# Patient Record
Sex: FEMALE | Race: WHITE | NOT HISPANIC OR LATINO
[De-identification: names, ages, dates, MRNs, and addresses within clinical notes are randomized per-mention and may not be internally consistent; named-entity substitution may affect disease eponyms.]

---

## 2018-12-25 ENCOUNTER — TRANSCRIPTION ENCOUNTER (OUTPATIENT)
Age: 43
End: 2018-12-25

## 2019-02-12 ENCOUNTER — RESULT REVIEW (OUTPATIENT)
Age: 44
End: 2019-02-12

## 2019-02-12 ENCOUNTER — OUTPATIENT (OUTPATIENT)
Dept: OUTPATIENT SERVICES | Facility: HOSPITAL | Age: 44
LOS: 1 days | Discharge: HOME | End: 2019-02-12
Payer: COMMERCIAL

## 2019-02-12 VITALS
TEMPERATURE: 99 F | SYSTOLIC BLOOD PRESSURE: 140 MMHG | DIASTOLIC BLOOD PRESSURE: 93 MMHG | RESPIRATION RATE: 14 BRPM | WEIGHT: 179.9 LBS | HEIGHT: 70 IN | OXYGEN SATURATION: 97 % | HEART RATE: 77 BPM

## 2019-02-12 VITALS
HEART RATE: 88 BPM | SYSTOLIC BLOOD PRESSURE: 132 MMHG | DIASTOLIC BLOOD PRESSURE: 78 MMHG | TEMPERATURE: 98 F | OXYGEN SATURATION: 100 % | RESPIRATION RATE: 16 BRPM

## 2019-02-12 DIAGNOSIS — N62 HYPERTROPHY OF BREAST: ICD-10-CM

## 2019-02-12 DIAGNOSIS — Z98.890 OTHER SPECIFIED POSTPROCEDURAL STATES: Chronic | ICD-10-CM

## 2019-02-12 LAB — HCG UR QL: NEGATIVE — SIGNIFICANT CHANGE UP

## 2019-02-12 PROCEDURE — 88305 TISSUE EXAM BY PATHOLOGIST: CPT | Mod: 26

## 2019-02-12 RX ORDER — FAMOTIDINE 10 MG/ML
20 INJECTION INTRAVENOUS ONCE
Qty: 0 | Refills: 0 | Status: COMPLETED | OUTPATIENT
Start: 2019-02-12 | End: 2019-02-12

## 2019-02-12 RX ORDER — ONDANSETRON 8 MG/1
4 TABLET, FILM COATED ORAL EVERY 6 HOURS
Qty: 0 | Refills: 0 | Status: DISCONTINUED | OUTPATIENT
Start: 2019-02-12 | End: 2019-02-12

## 2019-02-12 RX ORDER — OXYCODONE HYDROCHLORIDE 5 MG/1
10 TABLET ORAL ONCE
Qty: 0 | Refills: 0 | Status: DISCONTINUED | OUTPATIENT
Start: 2019-02-12 | End: 2019-02-12

## 2019-02-12 RX ORDER — SODIUM CHLORIDE 9 MG/ML
1000 INJECTION, SOLUTION INTRAVENOUS
Qty: 0 | Refills: 0 | Status: DISCONTINUED | OUTPATIENT
Start: 2019-02-12 | End: 2019-02-12

## 2019-02-12 RX ORDER — OXYCODONE AND ACETAMINOPHEN 5; 325 MG/1; MG/1
2 TABLET ORAL EVERY 4 HOURS
Qty: 0 | Refills: 0 | Status: DISCONTINUED | OUTPATIENT
Start: 2019-02-12 | End: 2019-02-12

## 2019-02-12 RX ORDER — ONDANSETRON 8 MG/1
4 TABLET, FILM COATED ORAL EVERY 6 HOURS
Qty: 0 | Refills: 0 | Status: DISCONTINUED | OUTPATIENT
Start: 2019-02-12 | End: 2019-02-27

## 2019-02-12 RX ORDER — OXYCODONE HYDROCHLORIDE 5 MG/1
5 TABLET ORAL ONCE
Qty: 0 | Refills: 0 | Status: DISCONTINUED | OUTPATIENT
Start: 2019-02-12 | End: 2019-02-12

## 2019-02-12 RX ORDER — OXYCODONE AND ACETAMINOPHEN 5; 325 MG/1; MG/1
1 TABLET ORAL EVERY 4 HOURS
Qty: 0 | Refills: 0 | Status: DISCONTINUED | OUTPATIENT
Start: 2019-02-12 | End: 2019-02-12

## 2019-02-12 RX ORDER — ACETAMINOPHEN 500 MG
650 TABLET ORAL EVERY 6 HOURS
Qty: 0 | Refills: 0 | Status: DISCONTINUED | OUTPATIENT
Start: 2019-02-12 | End: 2019-02-27

## 2019-02-12 RX ORDER — SODIUM CHLORIDE 9 MG/ML
1000 INJECTION, SOLUTION INTRAVENOUS
Qty: 0 | Refills: 0 | Status: DISCONTINUED | OUTPATIENT
Start: 2019-02-12 | End: 2019-02-27

## 2019-02-12 RX ORDER — FENTANYL CITRATE 50 UG/ML
50 INJECTION INTRAVENOUS
Qty: 0 | Refills: 0 | Status: DISCONTINUED | OUTPATIENT
Start: 2019-02-12 | End: 2019-02-12

## 2019-02-12 RX ORDER — ACETAMINOPHEN 500 MG
975 TABLET ORAL ONCE
Qty: 0 | Refills: 0 | Status: COMPLETED | OUTPATIENT
Start: 2019-02-12 | End: 2019-02-12

## 2019-02-12 RX ORDER — SODIUM CHLORIDE 9 MG/ML
3 INJECTION INTRAMUSCULAR; INTRAVENOUS; SUBCUTANEOUS EVERY 8 HOURS
Qty: 0 | Refills: 0 | Status: DISCONTINUED | OUTPATIENT
Start: 2019-02-12 | End: 2019-02-12

## 2019-02-12 RX ADMIN — OXYCODONE HYDROCHLORIDE 10 MILLIGRAM(S): 5 TABLET ORAL at 13:41

## 2019-02-12 RX ADMIN — OXYCODONE HYDROCHLORIDE 5 MILLIGRAM(S): 5 TABLET ORAL at 17:14

## 2019-02-12 RX ADMIN — Medication 975 MILLIGRAM(S): at 13:32

## 2019-02-12 RX ADMIN — Medication 975 MILLIGRAM(S): at 13:41

## 2019-02-12 RX ADMIN — OXYCODONE HYDROCHLORIDE 5 MILLIGRAM(S): 5 TABLET ORAL at 17:45

## 2019-02-12 NOTE — ASU DISCHARGE PLAN (ADULT/PEDIATRIC). - NOTIFY
Swelling that continues/Bleeding that does not stop/Pain not relieved by Medications/Unable to Urinate/Fever greater than 101/Persistent Nausea and Vomiting

## 2019-02-12 NOTE — H&P ADULT - PROBLEM SELECTOR PLAN 1
Breast reduction surgery   Pain control   OOB ambulating   Regular diet   SCDs  Discharge to home, f/u in office Friday 2/15/19

## 2019-02-12 NOTE — H&P ADULT - HISTORY OF PRESENT ILLNESS
Patient presents to [facility] for scheduled breast reduction surgery, patient complains of back, neck and shoulder pain due to enlarged size of breasts.

## 2019-02-12 NOTE — ASU DISCHARGE PLAN (ADULT/PEDIATRIC). - MEDICATION SUMMARY - MEDICATIONS TO TAKE
I will START or STAY ON the medications listed below when I get home from the hospital:    oxycodone-acetaminophen 5 mg-325 mg oral tablet  -- 1 tab(s) by mouth every 4 hours, As needed, Moderate Pain (4 - 6)  -- Indication: For Moderate to severe pain

## 2019-02-12 NOTE — BRIEF OPERATIVE NOTE - PROCEDURE
<<-----Click on this checkbox to enter Procedure Application of dermal and fat graft  02/12/2019    Active  [email or web address]  Breast reduction surgery  02/12/2019    Active  [email or web address]

## 2019-02-12 NOTE — ASU DISCHARGE PLAN (ADULT/PEDIATRIC). - SPECIAL INSTRUCTIONS
You should follow-up in the office in 3-4 days, please call for follow-up appointment if you have not already made one. [phone].   Prescription pain medications have been prescribed for you, take as needed for pain only. Do not drive a vehicle or operate machinery while taking narcotic pain medication.   Ambulating is very important post operatively, make sure you ambulate several times daily.   If you experience bleeding that does not stop, swelling, hardness of surgical site, chest pain, shortness of breath, leg pain, dizziness or any signs or symptoms of infection such as fever, redness, pus, foul smell of surgical site please contact the office immediately, [phone]. You should follow-up in the office in 3-4 days, please call for follow-up appointment if you have not already made one. [phone].   Prescription pain medications have been prescribed for you, take as needed for pain only. Do not drive a vehicle or operate machinery while taking narcotic pain medication.   Ambulating is very important post operatively, make sure you ambulate several times daily.   You have drains in your bilateral breasts, record drain output daily and bring the recordings with you to your first post operative visit, call on Thursday and report output.   If you experience bleeding that does not stop, swelling, hardness of surgical site, chest pain, shortness of breath, leg pain, dizziness or any signs or symptoms of infection such as fever, redness, pus, foul smell of surgical site please contact the office immediately, [phone].

## 2019-02-12 NOTE — ASU PATIENT PROFILE, ADULT - FALLEN IN THE PAST
12/26/2017    Call Regarding Preventive Health Screening Colonoscopy, Mammogram and Cervical/PAP    Attempt 2    Message on voicemail     Comments:           Outreach   AT     no

## 2019-02-12 NOTE — H&P ADULT - NSHPPHYSICALEXAM_GEN_ALL_CORE
General - A&Ox3, male   Cardiac - RRR  Pulm - CTA, bilaterally. No adventitious breath sounds   Abdomen - soft, NT, ND   Breasts - hypertrophic, ptotic, no masses.

## 2019-02-15 LAB — SURGICAL PATHOLOGY FINAL REPORT - CH: SIGNIFICANT CHANGE UP

## 2019-02-20 DIAGNOSIS — N62 HYPERTROPHY OF BREAST: ICD-10-CM

## 2021-06-12 ENCOUNTER — TRANSCRIPTION ENCOUNTER (OUTPATIENT)
Age: 46
End: 2021-06-12

## 2024-12-20 ENCOUNTER — EMERGENCY (EMERGENCY)
Facility: HOSPITAL | Age: 49
LOS: 0 days | Discharge: HOME | End: 2024-12-20
Attending: EMERGENCY MEDICINE
Payer: COMMERCIAL

## 2024-12-20 VITALS
SYSTOLIC BLOOD PRESSURE: 119 MMHG | DIASTOLIC BLOOD PRESSURE: 88 MMHG | RESPIRATION RATE: 18 BRPM | HEART RATE: 98 BPM | OXYGEN SATURATION: 100 % | TEMPERATURE: 98 F

## 2024-12-20 VITALS — WEIGHT: 155.43 LBS
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
## 2024-12-20 DIAGNOSIS — S80.01XA CONTUSION OF RIGHT KNEE, INITIAL ENCOUNTER: ICD-10-CM

## 2024-12-20 DIAGNOSIS — S01.81XA LACERATION WITHOUT FOREIGN BODY OF OTHER PART OF HEAD, INITIAL ENCOUNTER: ICD-10-CM

## 2024-12-20 DIAGNOSIS — S09.90XA UNSPECIFIED INJURY OF HEAD, INITIAL ENCOUNTER: ICD-10-CM

## 2024-12-20 DIAGNOSIS — S63.502A UNSPECIFIED SPRAIN OF LEFT WRIST, INITIAL ENCOUNTER: ICD-10-CM

## 2024-12-20 DIAGNOSIS — M25.532 PAIN IN LEFT WRIST: ICD-10-CM

## 2024-12-20 DIAGNOSIS — Z98.890 OTHER SPECIFIED POSTPROCEDURAL STATES: Chronic | ICD-10-CM
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
## 2024-12-20 PROCEDURE — 99283 EMERGENCY DEPT VISIT LOW MDM: CPT | Mod: 25

## 2024-12-20 PROCEDURE — 99284 EMERGENCY DEPT VISIT MOD MDM: CPT | Mod: 25

## 2024-12-20 PROCEDURE — 12011 RPR F/E/E/N/L/M 2.5 CM/<: CPT

## 2024-12-20 PROCEDURE — 73110 X-RAY EXAM OF WRIST: CPT | Mod: 26,LT

## 2024-12-20 PROCEDURE — 73110 X-RAY EXAM OF WRIST: CPT | Mod: LT

## 2024-12-20 NOTE — ED PROCEDURE NOTE - CPROC ED POST PROC CARE GUIDE1
Verbal/written post procedure instructions were given to patient/caregiver./Instructed patient/caregiver to follow-up with primary care physician./Instructed patient/caregiver regarding signs and symptoms of infection.
Verbal/written post procedure instructions were given to patient/caregiver./Instructed patient/caregiver to follow-up with primary care physician./Instructed patient/caregiver regarding signs and symptoms of infection./Elevate the injured extremity as instructed./Keep the cast/splint/dressing clean and dry.

## 2024-12-20 NOTE — ED PROVIDER NOTE - CARE PLAN
Principal Discharge DX:	Left wrist sprain, initial encounter  Secondary Diagnosis:	Forehead abrasion, initial encounter  Secondary Diagnosis:	Closed head injury without loss of consciousness, initial encounter  Secondary Diagnosis:	Facial laceration, initial encounter  Secondary Diagnosis:	Contusion of knee, initial encounter   1

## 2024-12-20 NOTE — ED ADULT TRIAGE NOTE - NS ED TRIAGE AVPU SCALE
Hi Dr Taisha Shabazz was in our breast center today for her breast biopsy and we are concerned about her BP readings, pt initial pressure was 216/121, HR 86 at 0831  She had her biopsy and upon d/c at 0928 her BP was 219/109, HR 75  Pt states she was anxious at the beginning of the procedure but felt better afterwards but her blood pressure was still extremely elevated and we wanted to make sure someone followed up on this        Thank you,    Melo Knight, MSN, RN, CN-BN  Breast Nurse Navigator Alert-The patient is alert, awake and responds to voice. The patient is oriented to time, place, and person. The triage nurse is able to obtain subjective information.

## 2024-12-20 NOTE — ED PROCEDURE NOTE - CPROC ED TIME OUT STATEMENT1
“Patient's name, , procedure and correct site were confirmed during the [place] Timeout.”
“Patient's name, , procedure and correct site were confirmed during the [place] Timeout.”

## 2024-12-20 NOTE — ED PROVIDER NOTE - SECONDARY DIAGNOSIS.
Contusion of knee, initial encounter Facial laceration, initial encounter Forehead abrasion, initial encounter Closed head injury without loss of consciousness, initial encounter

## 2024-12-20 NOTE — ED ADULT TRIAGE NOTE - CHIEF COMPLAINT QUOTE
Pt presents to ED c/o laceration to left forehead and upper lip and pain to left wrist, right knee and right elbow after trip fall in hallway. Denies loss of consciousness, dizziness, nausea, vomiting and use of blood thinner. Bleeding controlled at this time.

## 2024-12-20 NOTE — ED PROVIDER NOTE - CARE PROVIDER_API CALL
Chapo Carrillo.  Orthopaedic Surgery  [address] [place] [address]  Phone: [phone]  Fax: [phone]  Follow Up Time:

## 2024-12-20 NOTE — ED PROVIDER NOTE - OBJECTIVE STATEMENT
59-year-old WF, no PMH, ambulatory to ED complaining of moderate to severe painful left wrist injury > R knee contusion,  left forehead abrasion, scratch left NL fold s/p mechanical slip and fall this morning 06:15 upon outstretched LUE (FOOSH) & B/L knees onto wooden floor.   Left wrist pain aggravated by AROM, no left hand pain no large fingers weak/numb/tingling.  Patient denies LOC, headache, vision/speech/swallow changes, dizziness, gait abnormality, N/V, photosensitivity, neck pain.   Slight soreness bilateral anterior knees not impairing ambulation.  Patient and  report normal gait without difficulty.  Slight discomfort right elbow.  Patient took Advil at 630 with slight but inadequate symptomatic relief of left wrist pain.   Slight soreness locally at L forehead abrasion but denies specific headache itself.  Last Td 1 mo. ago 59-year-old WF, no PMH, ambulatory to ED complaining of moderate to severe painful left wrist injury > R knee contusion,  left forehead abrasion, scratch left NL fold s/p mechanical slip and fall this morning 06:15 upon outstretched LUE (FOOSH) & B/L knees onto wooden floor.   Left wrist pain aggravated by AROM, no left hand pain, no fingers weak/numb/tingling.  Patient denies LOC, headache, vision/speech/swallow changes, dizziness, gait abnormality, N/V, photosensitivity, neck pain.   Slight soreness bilateral anterior knees not impairing ambulation.  Patient and  report normal gait without difficulty.  Slight discomfort right elbow.  Patient took Advil at 06:30 with slight but inadequate symptomatic relief of left wrist pain.   Slight soreness locally at L forehead abrasion but denies specific headache itself.  Last Td 1 mo. ago

## 2024-12-20 NOTE — ED PROVIDER NOTE - PATIENT PORTAL LINK FT
You can access the FollowMyHealth Patient Portal offered by [facility] by registering at the following website: [email or web address]. By joining [facility]’s FollowMyHealth portal, you will also be able to view your health information using other applications (apps) compatible with our system.

## 2024-12-20 NOTE — ED ADULT NURSE NOTE - OBJECTIVE STATEMENT
Pt presents to ED c/o trip and fall. +head strike to left forehead. denies LOC denies AC. pt c/o right wrist pain.

## 2024-12-20 NOTE — ED PROVIDER NOTE - CLINICAL SUMMARY MEDICAL DECISION MAKING FREE TEXT BOX
59-year-old WF, no PMH, ambulatory to ED complaining of moderate to severe painful left wrist injury > R knee contusion,  left forehead abrasion, scratch left NL fold s/p mechanical slip and fall this morning 06:15 upon outstretched LUE (FOOSH) & B/L knees onto wooden floor.  No neuro c/o's, Neuro exam intact.  L wrist: + mildly swollen, + TTP, decreased AROM d/t pain, L hand + NVI.    Plan: XR L wrist, Dermabond to L NL lac. Observe, reassess. 59-year-old WF, no PMH, ambulatory to ED complaining of moderate to severe painful left wrist injury > R knee contusion,  left forehead abrasion, scratch left NL fold s/p mechanical slip and fall this morning 06:15 upon outstretched LUE (FOOSH) & B/L knees onto wooden floor.  No neuro c/o's, Neuro exam intact.  L wrist: + mildly swollen, + TTP, decreased AROM d/t pain, L hand + NVI.    Plan: XR L wrist, Dermabond to L NL lac. Observe, reassess.    09:00, DURGA Fine MD:  Dermabond closure of l facial lac completed w/o complication.  L wrist commercial splint applied w/o complication.  See Procedure Notes.  Pt informed XR wet read by myself w/o obvious fx, no dislocation, official report to follow later today.  Pt expressed her understanding & agrees with DC home for outpt PCP & Ortho f/u. 59-year-old WF, no PMH, ambulatory to ED complaining of moderate to severe painful left wrist injury > R knee contusion, L forehead abrasion, scratch left NL fold s/p mechanical slip and fall this morning 06:15 upon outstretched LUE (FOOSH) & B/L knees onto wooden floor.  No neuro c/o's, Neuro exam intact.  L wrist: + mildly swollen, + TTP, decreased AROM d/t pain, L hand + NVI.    Plan: XR L wrist, Dermabond to L NL lac. Observe, reassess.    09:00, DURGA Fine MD:  Dermabond closure of L facial lac completed w/o complication.  L wrist commercial splint applied w/o complication.  See Procedure Notes.  Pt informed XR wet read by myself w/o obvious fx, no dislocation, official report to follow later today.  Pt expressed her understanding & agrees with DC home for outpt PCP & Ortho f/u.

## 2024-12-20 NOTE — ED PROVIDER NOTE - NSFOLLOWUPINSTRUCTIONS_ED_ALL_ED_FT
Tylenol or Motrin: regular strength 2 tablets every 6 hours as needed for headache, aches and pains.    Continue your regular medications as per routine.    Keep wrist splint on, in place, clean and dry.  Only remove for bathing purposes then reapply until formal Orthopedic follow-up.    Follow-up with your own orthopedist or as listed below.  Call today to expedite follow-up for early next week.    Wear left arm sling as often as tolerated.    Dermabond tissue adhesive closure of left facial laceration to remain unperturbed until it sloughs off on its own in 5 to 7 days.  Do not apply any creams or antibacterial ointments to that site.      Closed Head Injury    A closed head injury is an injury to your head that may or may not involve a traumatic brain injury (TBI). Symptoms of TBI can be short or long lasting and include headache, dizziness, interference with memory or speech, fatigue, confusion, changes in sleep, mood changes, nausea, depression/anxiety, and dulling of senses. Make sure to obtain proper rest which includes getting plenty of sleep, avoiding excessive visual stimulation, and avoiding activities that may cause physical or mental stress. Avoid any situation where there is potential for another head injury, including sports.    SEEK IMMEDIATE MEDICAL CARE IF YOU HAVE ANY OF THE FOLLOWING SYMPTOMS: unusual drowsiness, vomiting, severe dizziness, seizures, lightheadedness, muscular weakness, different pupil sizes, visual changes, or clear or bloody discharge from your ears or nose.          Tissue Adhesive Wound Care  Some cuts, wounds, lacerations, and incisions can be repaired by using tissue adhesive, also called skin glue. It holds the skin together so healing can happen faster. It forms a strong bond on the skin in about 1 minute, and it reaches its full strength in about 2–3 minutes. The adhesive goes away on its own while the wound is healing. It is important to take good care of your wound while it heals.    Follow these instructions at home:  Wound care    Washing hands with soap and water.  Two wounds closed with skin glue. One is normal. The other is red with pus and infected.  If a bandage (dressing) has been applied, keep it clean and dry.  Follow instructions from your health care provider about how often to change the dressing. Make sure you:  Wash your hands with soap and water for at least 20 seconds before and after you change your dressing. If soap and water are not available, use hand .  Change your dressing as told by your health care provider.  Leave tissue adhesive in place. It will come off on its own after 7–10 days.  Do not scratch, rub, or pick at the adhesive.  Do not place tape over the adhesive. The adhesive could come off the wound when you pull the tape off.  Check the wound daily to make sure it is not starting to reopen.  Protect the wound from further injury until it is healed.  Check your wound area every day for signs of infection. Check for:  More redness, swelling, or pain.  Fluid or blood.  Warmth or a rash around the wound.  Pus or a bad smell.  Hardness or a lump that is not from the adhesive.  Bathing    Do not take baths, swim, or use a hot tub until your health care provider approves. Ask your health care provider if you may take showers. You may only be allowed to take sponge baths.  You can usually shower after the first 24 hours.  Cover the dressing with a watertight covering when you take a shower.  Do not soak the area where there is tissue adhesive.  Do not use any soaps, petroleum jelly products, or ointments on the wound. Certain ointments can weaken the adhesive.  Eating and drinking    Eat healthy foods to help the wound heal. As told by your health care provider, eat foods rich in:  Protein. These include meat, fish, eggs, dairy, beans, and nuts.  Vitamin A. These include carrots and dark green, leafy vegetables.  Vitamin C. These include citrus fruits, tomatoes, broccoli, and peppers.  Drink enough fluid to keep your urine pale yellow.  General instructions    Protect your wound from the sun when you are outside for the first 6 months, or for as long as told by your health care provider. Apply sunscreen with an SPF of 30 or higher around the scar, or cover it up.  Take over-the-counter and prescription medicines only as told by your health care provider.  Do not use any products that contain nicotine or tobacco. These products include cigarettes, chewing tobacco, and vaping devices, such as e-cigarettes. These can delay wound healing. If you need help quitting, ask your health care provider.  Keep all follow-up visits. This is important.  Contact a health care provider if:  The tissue adhesive becomes soaked with blood or falls off before your wound has healed. The adhesive may need to be replaced.  You have a fever or chills.  You have redness, swelling, or pain around the wound.  You have fluid or blood coming from the wound.  You develop a rash after the adhesive is applied.  You have hardness around the wound site.  Get help right away if:  Your wound reopens.  You have a red streak at the area around the wound.  You have pus or a bad smell coming from the wound.  Summary  The adhesive goes away on its own while the wound is healing. It is important to take good care of your wound at home while it heals.  Always wash your hands with soap and water for at least 20 seconds before and after changing your bandage (dressing).  To help with healing, eat foods that are rich in protein, vitamin A, and vitamin C.  Check your wound area every day for signs of infection.  This information is not intended to replace advice given to you by your health care provider. Make sure you discuss any questions you have with your health care provider.        Wrist Pain, Adult  There are many things that can cause wrist pain. Some common causes include:  An injury to the wrist.  Using the joint too much.  A condition that causes too much pressure to be put on a nerve in the wrist (carpal tunnel syndrome).  Wear and tear of the joints that happens as a person gets older (osteoarthritis).  A condition that causes swelling and stiffness in the joints (arthritis).  Sometimes, the cause of wrist pain is not known.    Often, the pain goes away when you follow your doctor's instructions for easing pain at home. This may include resting your wrist, icing your wrist, or using a splint or an elastic wrap for a short time. It is important to tell your doctor if your wrist pain does not go away.    Follow these instructions at home:  If you have a splint or elastic wrap that can be taken off:    Wear the splint or wrap as told by your doctor. Take it off only as told by your doctor. Ask if you can take it off for bathing.  Check the skin around the splint or wrap every day. Tell your doctor if you see problems.  Loosen the splint or wrap if your fingers:  Tingle.  Become numb.  Turn cold and blue.  Keep the splint or wrap clean.  If the splint or wrap is not waterproof:  Do not let it get wet.  Cover it with a watertight covering when you take a bath or shower.  Managing pain, stiffness, and swelling    A bag of ice on a towel on the skin.  If told, put ice on the painful area.  If you have a removable splint or wrap, take it off as told by your doctor.  Put ice in a plastic bag.  Place a towel between your skin and the bag or between your splint or wrap and the bag.  Leave the ice on for 20 minutes, 2–3 times a day.  If your skin turns bright red, take off the ice right away to prevent skin damage. The risk of damage is higher if you cannot feel pain, heat, or cold.  Move your fingers often.  Raise the injured area above the level of your heart while you are sitting or lying down.  Activity    Rest your wrist as told by your doctor.  Return to your normal activities when your doctor says that it is safe.  Ask your doctor when it is safe to drive if you have a splint or wrap on your wrist.  Do exercises as told by your doctor.  General instructions    Pay attention to any changes in your symptoms.  Take over-the-counter and prescription medicines only as told by your doctor.  Contact a doctor if:  You have a sudden, sharp pain in the wrist, hand, or arm that is different or new.  Any swelling or bruising on your wrist or hand gets worse.  Your skin:  Becomes red.  Has a rash.  Has open sores.  Your pain does not get better.  Your pain gets worse.  You have a fever or chills.  Get help right away if:  You lose feeling in your fingers or hand.  Your fingers turn white, very red, or cold and blue.  You cannot move your fingers.  This information is not intended to replace advice given to you by your health care provider. Make sure you discuss any questions you have with your health care provider.        Abrasion    WHAT YOU NEED TO KNOW:    An abrasion is a scrape on your skin. It happens when your skin rubs against a rough surface. Some examples of an abrasion include rug burn, a skinned elbow, or road rash. Abrasions can be many shapes and sizes. The wound may hurt, bleed, bruise, or swell.     DISCHARGE INSTRUCTIONS:    Return to the emergency department if:     The bleeding does not stop after 10 minutes of firm pressure.      You cannot rinse one or more foreign objects out of your wound.      You have red streaks on your skin coming from your wound.    Contact your healthcare provider if:     You have a fever or chills.       Your abrasion is red, warm, swollen, or draining pus.      You have questions or concerns about your condition or care.    Care for your abrasion:     Wash your hands and dry them with a clean towel.      Press a clean cloth against your wound to stop any bleeding.      Rinse your wound with a lot of clean water. Do not use harsh soap, alcohol, or iodine solutions.      Use a clean, wet cloth to remove any objects, such as small pieces of rocks or dirt.      Rub antibiotic ointment on your wound. This may help prevent infection and help your wound heal.      Cover the wound with a non-stick bandage. Change the bandage daily, and if gets wet or dirty.     Follow up with your healthcare provider as directed: Write down your questions so you remember to ask them during your visits.

## 2024-12-20 NOTE — ED PROVIDER NOTE - PHYSICAL EXAMINATION
Gen'l: WF adult, alert, no respiratory distress, non-toxic  Head: NC/AT other than mild L forehead hematoma w/ overlying abrasion, no active bleeding, no skull deformity  Eyes: PERRL, EOMI, no raccoon's  ENT: No Najera's, O/P clear, mmm, + superf. scratch L NL fold, minimal separation of wound edges, no active bleeding. Teeth intact. No other clinical evidence of facial injury  CV: RRR, normal radial pulse  Lungs: CTA, normal respirations  GI: soft, NT/ND, BS+  : Deferred, no flank nor CVAT  Neck: NT, supple w/o pain  MSK: Back, chest wall, pelvis: NT & stable.  CLIEN x 4,  B/L SLR 40 degrees w/o pain, normal motor.  L wrist; mild sts, no discoloration, mild decreased AROM d/t pain, radial pulse normal. L Hand: NT, no swelling, digfits move well, normal motor/sensory, normal CR. B/L knees; NT, no swelling/effusion, AFROM w/o pain, jts stable.  R elbow: NT, no swelling/effusion, AFROM w/o pain, jt stable.  Skin: No tactile warmth, no rash.  See Face section above re: external signs og trauma  Neuro: A+O x 3, CN 2 - 12 intact, normal speech, no focal motor/sensory deficits Gen'l: WF adult, alert, no respiratory distress, non-toxic  Head: NC/AT other than mild L forehead hematoma w/ overlying abrasion, no active bleeding, no skull deformity  Eyes: PERRL, EOMI, no raccoon's  ENT: No Najera's, O/P clear, mmm, + superf. scratch L NL fold, minimal separation of wound edges, no active bleeding. Teeth intact. No other clinical evidence of facial injury  CV: RRR, normal radial pulse  Lungs: CTA, normal respirations  GI: soft, NT/ND, BS+  : Deferred, no flank nor CVAT  Neck: NT, supple w/o pain  MSK: Back, chest wall, pelvis: NT & stable.  CLINE x 4,  B/L SLR 40 degrees w/o pain, normal motor.  L wrist: mild sts, no discoloration, mild decreased AROM d/t pain, + TTP, radial pulse normal. L Hand: NT, no swelling, digits move well, normal motor/sensory, normal CR. B/L knees: NT, no swelling/effusion, AFROM w/o pain, jts stable.  R elbow: NT, no swelling/effusion, AFROM w/o pain, jt stable.  Skin: No tactile warmth, no rash.  See Face section above re: external signs of trauma  Neuro: A+O x 3, CN 2 - 12 intact, normal speech, no focal motor/sensory deficits